# Patient Record
Sex: FEMALE
[De-identification: names, ages, dates, MRNs, and addresses within clinical notes are randomized per-mention and may not be internally consistent; named-entity substitution may affect disease eponyms.]

---

## 2021-12-02 ENCOUNTER — APPOINTMENT (OUTPATIENT)
Dept: PHYSICAL MEDICINE AND REHAB | Facility: CLINIC | Age: 41
End: 2021-12-02
Payer: COMMERCIAL

## 2021-12-02 VITALS
HEIGHT: 65 IN | HEART RATE: 75 BPM | OXYGEN SATURATION: 99 % | TEMPERATURE: 97.2 F | DIASTOLIC BLOOD PRESSURE: 81 MMHG | SYSTOLIC BLOOD PRESSURE: 117 MMHG | WEIGHT: 138 LBS | BODY MASS INDEX: 22.99 KG/M2 | RESPIRATION RATE: 17 BRPM

## 2021-12-02 DIAGNOSIS — Z78.9 OTHER SPECIFIED HEALTH STATUS: ICD-10-CM

## 2021-12-02 DIAGNOSIS — Z80.9 FAMILY HISTORY OF MALIGNANT NEOPLASM, UNSPECIFIED: ICD-10-CM

## 2021-12-02 DIAGNOSIS — M54.6 PAIN IN THORACIC SPINE: ICD-10-CM

## 2021-12-02 DIAGNOSIS — Z98.890 OTHER SPECIFIED POSTPROCEDURAL STATES: ICD-10-CM

## 2021-12-02 DIAGNOSIS — Z80.0 FAMILY HISTORY OF MALIGNANT NEOPLASM OF DIGESTIVE ORGANS: ICD-10-CM

## 2021-12-02 DIAGNOSIS — M62.830 MUSCLE SPASM OF BACK: ICD-10-CM

## 2021-12-02 PROBLEM — Z00.00 ENCOUNTER FOR PREVENTIVE HEALTH EXAMINATION: Status: ACTIVE | Noted: 2021-12-02

## 2021-12-02 PROCEDURE — 99204 OFFICE O/P NEW MOD 45 MIN: CPT

## 2021-12-04 PROBLEM — Z78.9 DOES NOT USE ILLICIT DRUGS: Status: ACTIVE | Noted: 2021-12-02

## 2021-12-04 PROBLEM — Z80.9 FAMILY HISTORY OF MALIGNANT NEOPLASM: Status: ACTIVE | Noted: 2021-12-02

## 2021-12-04 PROBLEM — Z80.0 FAMILY HISTORY OF MALIGNANT NEOPLASM OF COLON: Status: ACTIVE | Noted: 2021-12-02

## 2021-12-04 PROBLEM — Z78.9 NON-SMOKER: Status: ACTIVE | Noted: 2021-12-02

## 2021-12-04 PROBLEM — Z78.9 NO PERTINENT PAST MEDICAL HISTORY: Status: RESOLVED | Noted: 2021-12-02 | Resolved: 2021-12-04

## 2021-12-04 PROBLEM — M54.6 ACUTE BILATERAL THORACIC BACK PAIN: Status: ACTIVE | Noted: 2021-12-04

## 2021-12-04 PROBLEM — M62.830 SPASM OF MUSCLE, BACK: Status: ACTIVE | Noted: 2021-12-02

## 2021-12-04 PROBLEM — Z98.890 HISTORY OF LOOP ELECTROSURGICAL EXCISION PROCEDURE (LEEP) OF CERVIX: Status: RESOLVED | Noted: 2021-12-02 | Resolved: 2021-12-04

## 2021-12-04 RX ORDER — VITAMIN A, VITAMIN C, VITAMIN D-3, VITAMIN E, VITAMIN B-1, VITAMIN B-2, NIACIN, VITAMIN B-6, CALCIUM, IRON, ZINC, COPPER 4000; 120; 400; 22; 1.84; 3; 20; 10; 1; 12; 200; 27; 25; 2 [IU]/1; MG/1; [IU]/1; MG/1; MG/1; MG/1; MG/1; MG/1; MG/1; UG/1; MG/1; MG/1; MG/1; MG/1
27-1 TABLET ORAL
Refills: 0 | Status: ACTIVE | COMMUNITY

## 2021-12-04 RX ORDER — LORATADINE 5 MG/5 ML
10 SOLUTION, ORAL ORAL
Refills: 0 | Status: ACTIVE | COMMUNITY

## 2021-12-04 RX ORDER — ALPRAZOLAM 0.25 MG/1
0.25 TABLET ORAL
Refills: 0 | Status: ACTIVE | COMMUNITY

## 2021-12-04 NOTE — REVIEW OF SYSTEMS
[Patient Intake Form Reviewed] : Patient intake form was reviewed [FreeTextEntry1] : see Narrative, also anxiety and feeling fatigued

## 2021-12-04 NOTE — ASSESSMENT
[FreeTextEntry1] : Assessment/plan.  Patient is a 41-year-old female with h/o preeclampsia who has developed acute on chronic back pain.  This back pain is likely exacerbated by the way she carries and lifts her baby from the floor and into and out of the crib.  It is difficult to modify her movements somewhat because of the need to  care for her baby.  We can do some education as to more efficiently doing lifting and carrying activities.  I would like her to go for a trial of physical therapy 2 times a week for range of motion, strengthening, posture and biomechanics, modalities prn, and home exercise program.\par She should return to see me if any questions or new problems arise or if therapy renewal is needed.\par Education provided and all questions answered.\par Time of office visit is 55 minutes.

## 2021-12-04 NOTE — HISTORY OF PRESENT ILLNESS
[FreeTextEntry1] : Patient is a 41-year-old female with history of preeclampsia who presents for evaluation of intermittent thoracic back pain which she says has gotten worse starting 4 weeks ago.  She has a history of intermittent back pain starting from when she was 20 years old (she was a dancer for many years).  About 4 weeks ago, she  noticed that the back pain is increased.   She notes that 4 weeks ago she started sitting on the ground with her  baby (4 months old) sitting in a low chair.  Patient intermittently gets up and twists when she carries her and picks her up.  Pt. also notices that she has to twist her back when she has to hold/carry the baby into and out of the crib.The back pain is like a searing, stabbing pain throughout her back.  She rates it 5-7/10.  The pain sometimes goes down to the buttocks but originates around her thoracic spine and sometimes goes up to the neck.  She receives massage therapy every 3 weeks but it helps only for a day or 2; hot baths help.  The pain does get worse at night but then it ultimately improves, especially as she lies flat.  She takes Advil 2 tabs about 4 times a day.  She admits to having occasional falls as a dancer, but no significant falls where she lost time.  She denies any trauma or car accidents involving increased back pain.  She had physical therapy about 20 years ago for left thigh issue and that helped.   She had increased back pain during her pregnancy.  She denies any lower extremity weakness or numbness or any bowel or bladder issues.  She did have preeclampsia and had to go back to the hospital where she was given labetalol and magnesium through an IV and then was sent home on the oral version.  She does mostly desk work pre-Covid and prior to the baby coming.  She is not using any assistive device.  She is independent in washing and dressing. \par She denies any change of weight, loss of appetite, shortness of breath, chest pain, abdominal pain, incontinence, fever, chills.

## 2021-12-04 NOTE — PHYSICAL EXAM
[FreeTextEntry1] : General: Well-developed female in no acute distress.  Neck rotation is within functional limits.  Range of motion in the back in flexion, lateral bending, and twisting is very good.  There is minimal decreased right SI joint mobility with flexion.  Lower extremity range of motion was within functional limits except for questionable decreased bilateral hip IR and right dorsiflexion (5 degrees on right versus 10 degrees on left) passively.  There is tightness and tenderness to palpation and taut bands palpated in several areas throughout L>R thoracic paraspinal muscles.  Motor: Upper and lower extremity strength is about 5/5 except for bilateral hip flexion which is 5 -/5.  Sensory: Intact to light touch diffusely.  Reflexes are 2+ throughout.  Babinski: Toes are downgoing.  Gait: she is independent going from sit to stand and walking without an assistive device.  She does have slightly decreased left arm swing.

## 2021-12-04 NOTE — SOCIAL HISTORY
[Spouse/Partner] : spouse/partner [Private Home] : in a private home [Stairs to enter?] : stairs to enter [No Device Needed] : Patient doesn't use a device for ambulation [de-identified] : Lives in house with  and daughter [FreeTextEntry1] : 3 + 15 steps to bedroom